# Patient Record
(demographics unavailable — no encounter records)

---

## 2025-05-22 NOTE — DISCUSSION/SUMMARY
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  currently she is having left knee pain and training for the [facility] in November.  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  Plan: : PT and home ex: for Quad core and glutes.  Cortisone injection x1 talke about other long term injections down the road if needed.  I am hopeful she can control her malalignment once the left Quads and glutes are fully strengthened as she obviously ahs for years prior.  I would not jump to realignment surgery at this time as she has lived with this anatomy for quite a while successfully.

## 2025-05-22 NOTE — PROCEDURE
[de-identified] : The left knee was prepped with alcohol and ethyl chloride was used to numb the skin. A 3 cc injection with 1 cc xylocaine, 1cc sensoricaine and 1 cc kenalog was given without complication into the knee joint. Patient instructed that there may be an inflammatory flare for 24 hrs , to use ice or advil if needed

## 2025-05-22 NOTE — HISTORY OF PRESENT ILLNESS
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 She states she has been having knee issues since 2016 no specific trauma but never got it checked out and it seem to be getting progressively worse over time. She reports that her knee swells at times and its very painful in a hoseshoe fashion around the patella.

## 2025-05-22 NOTE — PHYSICAL EXAM
[de-identified] : Right knee full rom some mild crepitance Quads 5/5 to isometric testing no effusion no joint tenderness Left knee medial facet and lateral facet tenderness. No joint line tenderness and negative Ashlyn and Lachman  Weak Quads 4/5 to isometric testing  [de-identified] : 4 views of both knees show patella subuxation/tilt and malalignment but no joint line narrowing yet. Good femoro-tibial joints.

## 2025-07-23 NOTE — PHYSICAL EXAM
[de-identified] :   KNEE EXAM:   INSPECTION: SKIN-NONE SCARS-NONE TRAUMA-NONE ERYTHEMA-NONE SWELLING-NONE MUSCLE ATROPHY-NONE EFFUSION-NONE ASYMMETRY-NONE GAIT-NO ANTALGIA MUSCLE WEAKNESS-Quads STANDING LIMB ALIGNMENT-(NEUTRAL)    PALPATION-PF Crepitus  (-)PATELLA BALLOTING  (-)DEFORMITY   ROM: ACTIVE AND PASSIVE EXTENSION-FLEXION 0-130   NEUROVASCULAR GROSS MOTOR/SENSORY FXN-INTACT VASCULAR: PULSES 2+ POPLITEAL/DORSALIS PEDIS/POSTERIOR TIBIAL   SPECIAL TESTS  (-)LACHMAN'S TEST  (-)ANTERIOR DRAWER TEST  (-)PIVOT SHIFT  (-)VALGUS STRESS TEST  (-)VARUS STRESS TEST  (-)POSTEROLATERAL DRAWER TEST  (-)REVERSED PIVOT SHIFT TEST  (-)ROBERT'S TEST  (-)CHRIS  (+)PATELLA APPREHENSION  (+)PATELLAR GRIND TEST  (-)J SIGN

## 2025-07-23 NOTE — DISCUSSION/SUMMARY
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 SHe has done PT and seen improvement but has symptoms as mileage increases.   Plan: Pt will continue PT. We will order an MRI of left knee to R/O OCD lesion and assess Patellar malalignment. She will follow up in 1st week of Sept. for re-evaluation and MRI review.

## 2025-07-23 NOTE — HISTORY OF PRESENT ILLNESS
[Pain Location] : pain [] : left knee [de-identified] : DESIRE MONTILLA is a 37 year-year-old female who presents today with complaints of left knee pain for the past couple days. The pain is described as sharp, aching, and is progressive. Patient reports the pain began as she's training for the marathon. Symptoms are exacerbated by walking, and running and alleviated by NSAIDs. Associated symptoms include swelling, stiffness, weakness, tingling. Patient is attending pt once a week.